# Patient Record
Sex: FEMALE | Race: BLACK OR AFRICAN AMERICAN | NOT HISPANIC OR LATINO
[De-identification: names, ages, dates, MRNs, and addresses within clinical notes are randomized per-mention and may not be internally consistent; named-entity substitution may affect disease eponyms.]

---

## 2018-01-21 ENCOUNTER — FORM ENCOUNTER (OUTPATIENT)
Age: 73
End: 2018-01-21

## 2019-01-06 ENCOUNTER — FORM ENCOUNTER (OUTPATIENT)
Age: 74
End: 2019-01-06

## 2020-01-05 ENCOUNTER — FORM ENCOUNTER (OUTPATIENT)
Age: 75
End: 2020-01-05

## 2020-01-07 ENCOUNTER — FORM ENCOUNTER (OUTPATIENT)
Age: 75
End: 2020-01-07

## 2020-12-30 ENCOUNTER — NON-APPOINTMENT (OUTPATIENT)
Age: 75
End: 2020-12-30

## 2020-12-30 DIAGNOSIS — E11.9 TYPE 2 DIABETES MELLITUS W/OUT COMPLICATIONS: ICD-10-CM

## 2020-12-30 DIAGNOSIS — Z78.0 ASYMPTOMATIC MENOPAUSAL STATE: ICD-10-CM

## 2020-12-30 DIAGNOSIS — Z78.9 OTHER SPECIFIED HEALTH STATUS: ICD-10-CM

## 2020-12-30 DIAGNOSIS — Z80.7 FAMILY HISTORY OF OTHER MALIGNANT NEOPLASMS OF LYMPHOID, HEMATOPOIETIC AND RELATED TISSUES: ICD-10-CM

## 2020-12-30 PROBLEM — Z00.00 ENCOUNTER FOR PREVENTIVE HEALTH EXAMINATION: Status: ACTIVE | Noted: 2020-12-30

## 2021-01-13 ENCOUNTER — APPOINTMENT (OUTPATIENT)
Dept: BREAST CENTER | Facility: CLINIC | Age: 76
End: 2021-01-13

## 2021-03-04 ENCOUNTER — APPOINTMENT (OUTPATIENT)
Dept: BREAST CENTER | Facility: CLINIC | Age: 76
End: 2021-03-04
Payer: MEDICARE

## 2021-03-04 VITALS
WEIGHT: 189 LBS | SYSTOLIC BLOOD PRESSURE: 140 MMHG | HEART RATE: 73 BPM | HEIGHT: 65 IN | BODY MASS INDEX: 31.49 KG/M2 | DIASTOLIC BLOOD PRESSURE: 85 MMHG

## 2021-03-04 DIAGNOSIS — M81.0 AGE-RELATED OSTEOPOROSIS W/OUT CURRENT PATHOLOGICAL FRACTURE: ICD-10-CM

## 2021-03-04 PROCEDURE — 99072 ADDL SUPL MATRL&STAF TM PHE: CPT

## 2021-03-04 PROCEDURE — 99213 OFFICE O/P EST LOW 20 MIN: CPT

## 2022-01-24 ENCOUNTER — APPOINTMENT (OUTPATIENT)
Dept: BREAST CENTER | Facility: CLINIC | Age: 77
End: 2022-01-24
Payer: MEDICARE

## 2022-01-24 ENCOUNTER — NON-APPOINTMENT (OUTPATIENT)
Age: 77
End: 2022-01-24

## 2022-03-24 ENCOUNTER — APPOINTMENT (OUTPATIENT)
Dept: BREAST CENTER | Facility: CLINIC | Age: 77
End: 2022-03-24
Payer: MEDICARE

## 2022-03-24 VITALS
HEART RATE: 71 BPM | HEIGHT: 65 IN | BODY MASS INDEX: 31.36 KG/M2 | DIASTOLIC BLOOD PRESSURE: 75 MMHG | SYSTOLIC BLOOD PRESSURE: 163 MMHG | WEIGHT: 188.25 LBS

## 2022-03-24 DIAGNOSIS — N64.4 MASTODYNIA: ICD-10-CM

## 2022-03-24 PROCEDURE — 99213 OFFICE O/P EST LOW 20 MIN: CPT

## 2022-03-24 RX ORDER — CALCIUM CARBONATE/VITAMIN D3 500-10/5ML
400 LIQUID (ML) ORAL
Refills: 0 | Status: ACTIVE | COMMUNITY

## 2023-02-17 ENCOUNTER — NON-APPOINTMENT (OUTPATIENT)
Age: 78
End: 2023-02-17

## 2023-02-17 ENCOUNTER — APPOINTMENT (OUTPATIENT)
Dept: OPHTHALMOLOGY | Facility: CLINIC | Age: 78
End: 2023-02-17
Payer: MEDICARE

## 2023-02-17 PROCEDURE — 92004 COMPRE OPH EXAM NEW PT 1/>: CPT

## 2023-02-17 PROCEDURE — 92025 CPTRIZED CORNEAL TOPOGRAPHY: CPT

## 2023-02-17 PROCEDURE — 92136 OPHTHALMIC BIOMETRY: CPT

## 2023-03-27 ENCOUNTER — APPOINTMENT (OUTPATIENT)
Dept: BREAST CENTER | Facility: CLINIC | Age: 78
End: 2023-03-27
Payer: MEDICARE

## 2023-03-27 VITALS
BODY MASS INDEX: 35.21 KG/M2 | HEIGHT: 61 IN | HEART RATE: 64 BPM | WEIGHT: 186.5 LBS | DIASTOLIC BLOOD PRESSURE: 83 MMHG | SYSTOLIC BLOOD PRESSURE: 137 MMHG

## 2023-03-27 DIAGNOSIS — Z78.9 OTHER SPECIFIED HEALTH STATUS: ICD-10-CM

## 2023-03-27 PROCEDURE — 99213 OFFICE O/P EST LOW 20 MIN: CPT

## 2023-03-27 RX ORDER — ASPIRIN 81 MG
81 TABLET, DELAYED RELEASE (ENTERIC COATED) ORAL
Refills: 0 | Status: ACTIVE | COMMUNITY

## 2023-03-27 NOTE — HISTORY OF PRESENT ILLNESS
[FreeTextEntry1] : Patient is a 76yo F who presents today for breast cancer screening. Hx of L benign breast excision bx 2003 and 2005. Denies family history of breast or ovarian cancer. Patient denies palpable masses, skin changes, or nipple discharge bilaterally.\par \par GLORIA Lifetime Risk- 2.9%\par \par 12/22/17: B/l MG- JANE\par 1/7/19: B/l MG- no evidence of disease\par 1/8/20: B/l MG- benign microcal bilat\par 3/4/21: B/L MG- Dense. LUOQ postsurgical changes. B/l scattered calcs. B/l scattered ovoid circumscribed subcentimeter masses, unchanged and benign. BIRADS 2.\par 3/24/22: B/l MG & US- heterogenously dense. JANE. BI-RADS 2\par 3/27/23: B/l MG & US- heterogenously dense. JANE. BI-RADS 1

## 2023-03-27 NOTE — PHYSICAL EXAM
[Normocephalic] : normocephalic [EOMI] : extra ocular movement intact [Supple] : supple [No Supraclavicular Adenopathy] : no supraclavicular adenopathy [No Cervical Adenopathy] : no cervical adenopathy [de-identified] : Bilateral breast/axilla/supraclavicular area: No masses, discharge, or adenopathy\par

## 2023-03-27 NOTE — PAST MEDICAL HISTORY
[Menarche Age ____] : age at menarche was [unfilled] [Menopause Age____] : age at menopause was [unfilled] [Total Preg ___] : G[unfilled] [History of Hormone Replacement Treatment] : has no history of hormone replacement treatment [FreeTextEntry6] : no [FreeTextEntry7] : no [FreeTextEntry8] : n/a

## 2023-06-02 NOTE — OPERATIVE REPORT - OPERATIVE RPOSRT DETAILS
DATE OF SURGERY:June 7, 2023    Surgeon:  Marsha Patterson    PRE-OP DIAGNOSIS: Cataract Left Eye    POST-OP DIAGNOSIS: Same    ANESTHESIA: MAC    PROCEDURE: Cataract extraction with intraocular lens implant left eye    SPECIMEN/TISSUE REMOVED: None    ESTIMATED BLOOD LOSS: < 1mL    COMPLICATIONS: None    The patient was brought to the operating room.  A drop of topical anesthetic was placed in the eye. The patient was prepped and draped in the usual sterile fashion, including Betadine drops in the eye. A lid speculum was placed between the lids of the left eye. A paracentesis was made inferior. Intracameral preservative free lidocaine was instilled and the anterior chamber was filled with air, trypan blue, and viscoelastic.. A clear cornea temporal incision was created using a 2.4mm keratome. A continuous curvilinear capsulorhexis was started with a cystotome and completed with capsulorhexis forceps. The lens was hydrodissected with BSS. The lens was then phacoemulsified using a divide and conquer technique. Residual cortical material was removed using automated irrigation and aspiration. The capsular bag was reformed using a viscoelastic. A ZCB00 20.50 lens was inserted into the bag. Symmetric capsular bag fixation was confirmed. The remaining viscoelastic was removed with automated irrigation and aspiration. The wounds were hydrated and checked to be water tight. The lid speculum was removed. Antibiotic/steroid ointment was instilled in the eye and a shield was placed over the eye. The patient left the OR in stable condition having tolerated the procedure well. IMarsha was present throughout the case.

## 2023-06-06 ENCOUNTER — NON-APPOINTMENT (OUTPATIENT)
Age: 78
End: 2023-06-06

## 2023-06-06 NOTE — ASU PATIENT PROFILE, ADULT - FALL HARM RISK - UNIVERSAL INTERVENTIONS
Bed in lowest position, wheels locked, appropriate side rails in place/Call bell, personal items and telephone in reach/Instruct patient to call for assistance before getting out of bed or chair/Non-slip footwear when patient is out of bed/[place] to call system/Physically safe environment - no spills, clutter or unnecessary equipment/Purposeful Proactive Rounding/Room/bathroom lighting operational, light cord in reach

## 2023-06-06 NOTE — ASU PATIENT PROFILE, ADULT - ABLE TO REACH PT
Instruction and time left on voicemail, patient instructed to arrive at 10:30am. No solid/dairy/candy/gum after midnight, water allowed before 09:00am tomorrow, patient reminded to bring photo ID/insurance card, no jewelries/contact lens/valuables, dress in comfortable clothes, no smoking/alcohol/recreational drug use tonight, escort to have a photo ID. Address and callback number was given./no

## 2023-06-07 ENCOUNTER — NON-APPOINTMENT (OUTPATIENT)
Age: 78
End: 2023-06-07

## 2023-06-07 ENCOUNTER — APPOINTMENT (OUTPATIENT)
Dept: OPHTHALMOLOGY | Facility: AMBULATORY SURGERY CENTER | Age: 78
End: 2023-06-07

## 2023-06-07 ENCOUNTER — TRANSCRIPTION ENCOUNTER (OUTPATIENT)
Age: 78
End: 2023-06-07

## 2023-06-07 ENCOUNTER — OUTPATIENT (OUTPATIENT)
Dept: OUTPATIENT SERVICES | Facility: HOSPITAL | Age: 78
LOS: 1 days | Discharge: HOME | End: 2023-06-07
Payer: MEDICARE

## 2023-06-07 VITALS
TEMPERATURE: 98 F | WEIGHT: 182.98 LBS | OXYGEN SATURATION: 97 % | SYSTOLIC BLOOD PRESSURE: 155 MMHG | RESPIRATION RATE: 15 BRPM | HEIGHT: 61 IN | HEART RATE: 63 BPM | DIASTOLIC BLOOD PRESSURE: 48 MMHG

## 2023-06-07 VITALS
DIASTOLIC BLOOD PRESSURE: 54 MMHG | SYSTOLIC BLOOD PRESSURE: 144 MMHG | OXYGEN SATURATION: 96 % | RESPIRATION RATE: 16 BRPM | TEMPERATURE: 97 F | HEART RATE: 69 BPM

## 2023-06-07 DIAGNOSIS — Z98.890 OTHER SPECIFIED POSTPROCEDURAL STATES: Chronic | ICD-10-CM

## 2023-06-07 DIAGNOSIS — S82.891A OTHER FRACTURE OF RIGHT LOWER LEG, INITIAL ENCOUNTER FOR CLOSED FRACTURE: Chronic | ICD-10-CM

## 2023-06-07 PROCEDURE — 66984 XCAPSL CTRC RMVL W/O ECP: CPT | Mod: LT

## 2023-06-07 DEVICE — LENS IOL TECNIS PROTEC ZCB00 20.5D
Type: IMPLANTABLE DEVICE | Site: LEFT (LEFT EYE) | Status: NON-FUNCTIONAL
Removed: 2023-06-07

## 2023-06-07 RX ORDER — KETOROLAC TROMETHAMINE 0.5 %
1 DROPS OPHTHALMIC (EYE)
Refills: 0 | Status: COMPLETED | OUTPATIENT
Start: 2023-06-07 | End: 2023-06-07

## 2023-06-07 RX ORDER — TROPICAMIDE 1 %
1 DROPS OPHTHALMIC (EYE)
Refills: 0 | Status: COMPLETED | OUTPATIENT
Start: 2023-06-07 | End: 2023-06-07

## 2023-06-07 RX ORDER — CYCLOPENTOLATE HYDROCHLORIDE 10 MG/ML
1 SOLUTION/ DROPS OPHTHALMIC
Refills: 0 | Status: COMPLETED | OUTPATIENT
Start: 2023-06-07 | End: 2023-06-07

## 2023-06-07 RX ORDER — OFLOXACIN 0.3 %
1 DROPS OPHTHALMIC (EYE)
Refills: 0 | Status: COMPLETED | OUTPATIENT
Start: 2023-06-07 | End: 2023-06-07

## 2023-06-07 RX ORDER — PHENYLEPHRINE HCL 2.5 %
1 DROPS OPHTHALMIC (EYE)
Refills: 0 | Status: COMPLETED | OUTPATIENT
Start: 2023-06-07 | End: 2023-06-07

## 2023-06-07 RX ADMIN — Medication 1 DROP(S): at 11:48

## 2023-06-07 RX ADMIN — CYCLOPENTOLATE HYDROCHLORIDE 1 DROP(S): 10 SOLUTION/ DROPS OPHTHALMIC at 11:32

## 2023-06-07 RX ADMIN — Medication 1 DROP(S): at 11:32

## 2023-06-07 RX ADMIN — Medication 1 DROP(S): at 11:42

## 2023-06-07 RX ADMIN — Medication 1 DROP(S): at 11:40

## 2023-06-07 RX ADMIN — CYCLOPENTOLATE HYDROCHLORIDE 1 DROP(S): 10 SOLUTION/ DROPS OPHTHALMIC at 11:47

## 2023-06-07 RX ADMIN — CYCLOPENTOLATE HYDROCHLORIDE 1 DROP(S): 10 SOLUTION/ DROPS OPHTHALMIC at 11:41

## 2023-06-07 RX ADMIN — Medication 1 DROP(S): at 11:31

## 2023-06-07 RX ADMIN — Medication 1 DROP(S): at 11:41

## 2023-06-07 RX ADMIN — Medication 1 DROP(S): at 11:47

## 2023-06-07 RX ADMIN — Medication 1 DROP(S): at 11:30

## 2023-06-07 NOTE — PRE-ANESTHESIA EVALUATION ADULT - NSANTHOSAYNRD_GEN_A_CORE
No. MEEK screening performed.  STOP BANG Legend: 0-2 = LOW Risk; 3-4 = INTERMEDIATE Risk; 5-8 = HIGH Risk

## 2023-06-07 NOTE — PRE-ANESTHESIA EVALUATION ADULT - NSATTENDATTESTRD_GEN_ALL_CORE
Bactrim Pregnancy And Lactation Text: This medication is Pregnancy Category D and is known to cause fetal risk.  It is also excreted in breast milk. Topical Sulfur Applications Pregnancy And Lactation Text: This medication is Pregnancy Category C and has an unknown safety profile during pregnancy. It is unknown if this topical medication is excreted in breast milk. Isotretinoin Pregnancy And Lactation Text: This medication is Pregnancy Category X and is considered extremely dangerous during pregnancy. It is unknown if it is excreted in breast milk. The patient has been re-examined and I agree with the above assessment or I updated with my findings. Minocycline Pregnancy And Lactation Text: This medication is Pregnancy Category D and not consider safe during pregnancy. It is also excreted in breast milk. Topical Retinoid Pregnancy And Lactation Text: This medication is Pregnancy Category C. It is unknown if this medication is excreted in breast milk. Benzoyl Peroxide Pregnancy And Lactation Text: This medication is Pregnancy Category C. It is unknown if benzoyl peroxide is excreted in breast milk. Tazorac Pregnancy And Lactation Text: This medication is not safe during pregnancy. It is unknown if this medication is excreted in breast milk. Birth Control Pills Counseling: Birth Control Pill Counseling: I discussed with the patient the potential side effects of OCPs including but not limited to increased risk of stroke, heart attack, thrombophlebitis, deep venous thrombosis, hepatic adenomas, breast changes, GI upset, headaches, and depression.  The patient verbalized understanding of the proper use and possible adverse effects of OCPs. All of the patient's questions and concerns were addressed. Erythromycin Counseling:  I discussed with the patient the risks of erythromycin including but not limited to GI upset, allergic reaction, drug rash, diarrhea, increase in liver enzymes, and yeast infections. High Dose Vitamin A Counseling: Side effects reviewed, pt to contact office should one occur. Topical Clindamycin Pregnancy And Lactation Text: This medication is Pregnancy Category B and is considered safe during pregnancy. It is unknown if it is excreted in breast milk. Birth Control Pills Pregnancy And Lactation Text: This medication should be avoided if pregnant and for the first 30 days post-partum. Doxycycline Pregnancy And Lactation Text: This medication is Pregnancy Category D and not consider safe during pregnancy. It is also excreted in breast milk but is considered safe for shorter treatment courses. Isotretinoin Counseling: Patient should get monthly blood tests, not donate blood, not drive at night if vision affected, not share medication, and not undergo elective surgery for 6 months after tx completed. Side effects reviewed, pt to contact office should one occur. High Dose Vitamin A Pregnancy And Lactation Text: High dose vitamin A therapy is contraindicated during pregnancy and breast feeding. Benzoyl Peroxide Counseling: Patient counseled that medicine may cause skin irritation and bleach clothing.  In the event of skin irritation, the patient was advised to reduce the amount of the drug applied or use it less frequently.   The patient verbalized understanding of the proper use and possible adverse effects of benzoyl peroxide.  All of the patient's questions and concerns were addressed. Minocycline Counseling: Patient advised regarding possible photosensitivity and discoloration of the teeth, skin, lips, tongue and gums.  Patient instructed to avoid sunlight, if possible.  When exposed to sunlight, patients should wear protective clothing, sunglasses, and sunscreen.  The patient was instructed to call the office immediately if the following severe adverse effects occur:  hearing changes, easy bruising/bleeding, severe headache, or vision changes.  The patient verbalized understanding of the proper use and possible adverse effects of minocycline.  All of the patient's questions and concerns were addressed. Topical Clindamycin Counseling: Patient counseled that this medication may cause skin irritation or allergic reactions.  In the event of skin irritation, the patient was advised to reduce the amount of the drug applied or use it less frequently.   The patient verbalized understanding of the proper use and possible adverse effects of clindamycin.  All of the patient's questions and concerns were addressed. Erythromycin Pregnancy And Lactation Text: This medication is Pregnancy Category B and is considered safe during pregnancy. It is also excreted in breast milk. Detail Level: Zone Spironolactone Counseling: Patient advised regarding risks of diarrhea, abdominal pain, hyperkalemia, birth defects (for female patients), liver toxicity and renal toxicity. The patient may need blood work to monitor liver and kidney function and potassium levels while on therapy. The patient verbalized understanding of the proper use and possible adverse effects of spironolactone.  All of the patient's questions and concerns were addressed. Doxycycline Counseling:  Patient counseled regarding possible photosensitivity and increased risk for sunburn.  Patient instructed to avoid sunlight, if possible.  When exposed to sunlight, patients should wear protective clothing, sunglasses, and sunscreen.  The patient was instructed to call the office immediately if the following severe adverse effects occur:  hearing changes, easy bruising/bleeding, severe headache, or vision changes.  The patient verbalized understanding of the proper use and possible adverse effects of doxycycline.  All of the patient's questions and concerns were addressed. Azithromycin Pregnancy And Lactation Text: This medication is considered safe during pregnancy and is also secreted in breast milk. Bactrim Counseling:  I discussed with the patient the risks of sulfa antibiotics including but not limited to GI upset, allergic reaction, drug rash, diarrhea, dizziness, photosensitivity, and yeast infections.  Rarely, more serious reactions can occur including but not limited to aplastic anemia, agranulocytosis, methemoglobinemia, blood dyscrasias, liver or kidney failure, lung infiltrates or desquamative/blistering drug rashes. Include Pregnancy/Lactation Warning?: No Tetracycline Counseling: Patient counseled regarding possible photosensitivity and increased risk for sunburn.  Patient instructed to avoid sunlight, if possible.  When exposed to sunlight, patients should wear protective clothing, sunglasses, and sunscreen.  The patient was instructed to call the office immediately if the following severe adverse effects occur:  hearing changes, easy bruising/bleeding, severe headache, or vision changes.  The patient verbalized understanding of the proper use and possible adverse effects of tetracycline.  All of the patient's questions and concerns were addressed. Patient understands to avoid pregnancy while on therapy due to potential birth defects. Dapsone Counseling: I discussed with the patient the risks of dapsone including but not limited to hemolytic anemia, agranulocytosis, rashes, methemoglobinemia, kidney failure, peripheral neuropathy, headaches, GI upset, and liver toxicity.  Patients who start dapsone require monitoring including baseline LFTs and weekly CBCs for the first month, then every month thereafter.  The patient verbalized understanding of the proper use and possible adverse effects of dapsone.  All of the patient's questions and concerns were addressed. Dapsone Pregnancy And Lactation Text: This medication is Pregnancy Category C and is not considered safe during pregnancy or breast feeding. Topical Sulfur Applications Counseling: Topical Sulfur Counseling: Patient counseled that this medication may cause skin irritation or allergic reactions.  In the event of skin irritation, the patient was advised to reduce the amount of the drug applied or use it less frequently.   The patient verbalized understanding of the proper use and possible adverse effects of topical sulfur application.  All of the patient's questions and concerns were addressed. Topical Retinoid counseling:  Patient advised to apply a pea-sized amount only at bedtime and wait 30 minutes after washing their face before applying.  If too drying, patient may add a non-comedogenic moisturizer. The patient verbalized understanding of the proper use and possible adverse effects of retinoids.  All of the patient's questions and concerns were addressed. Azithromycin Counseling:  I discussed with the patient the risks of azithromycin including but not limited to GI upset, allergic reaction, drug rash, diarrhea, and yeast infections. Spironolactone Pregnancy And Lactation Text: This medication can cause feminization of the male fetus and should be avoided during pregnancy. The active metabolite is also found in breast milk. Tazorac Counseling:  Patient advised that medication is irritating and drying.  Patient may need to apply sparingly and wash off after an hour before eventually leaving it on overnight.  The patient verbalized understanding of the proper use and possible adverse effects of tazorac.  All of the patient's questions and concerns were addressed.

## 2023-06-08 ENCOUNTER — NON-APPOINTMENT (OUTPATIENT)
Age: 78
End: 2023-06-08

## 2023-06-08 ENCOUNTER — APPOINTMENT (OUTPATIENT)
Dept: OPHTHALMOLOGY | Facility: CLINIC | Age: 78
End: 2023-06-08
Payer: MEDICARE

## 2023-06-08 PROCEDURE — 99024 POSTOP FOLLOW-UP VISIT: CPT

## 2023-06-14 ENCOUNTER — APPOINTMENT (OUTPATIENT)
Dept: OPHTHALMOLOGY | Facility: CLINIC | Age: 78
End: 2023-06-14
Payer: MEDICARE

## 2023-06-14 ENCOUNTER — NON-APPOINTMENT (OUTPATIENT)
Age: 78
End: 2023-06-14

## 2023-06-14 PROBLEM — I10 ESSENTIAL (PRIMARY) HYPERTENSION: Chronic | Status: ACTIVE | Noted: 2023-06-07

## 2023-06-14 PROCEDURE — 99024 POSTOP FOLLOW-UP VISIT: CPT

## 2023-07-20 ENCOUNTER — NON-APPOINTMENT (OUTPATIENT)
Age: 78
End: 2023-07-20

## 2023-07-20 ENCOUNTER — APPOINTMENT (OUTPATIENT)
Dept: OPHTHALMOLOGY | Facility: CLINIC | Age: 78
End: 2023-07-20
Payer: MEDICARE

## 2023-07-20 PROCEDURE — 99024 POSTOP FOLLOW-UP VISIT: CPT

## 2023-10-30 ENCOUNTER — APPOINTMENT (OUTPATIENT)
Dept: OPHTHALMOLOGY | Facility: CLINIC | Age: 78
End: 2023-10-30

## 2023-10-31 NOTE — ASU PATIENT PROFILE, ADULT - NSICDXPASTSURGICALHX_GEN_ALL_CORE_FT
PAST SURGICAL HISTORY:  Ankle fracture, right     S/P cataract extraction left    S/P lumpectomy, left breast

## 2023-10-31 NOTE — ASU PATIENT PROFILE, ADULT - NSICDXPASTMEDICALHX_GEN_ALL_CORE_FT
PAST MEDICAL HISTORY:  Hypertension      PAST MEDICAL HISTORY:  DM (diabetes mellitus) not on meds    Hypertension

## 2023-10-31 NOTE — ASU PATIENT PROFILE, ADULT - NS PREOP UNDERSTANDS INFO
No solid food/dairy/candy/gum after 10:30pm tonight; water allowed before 06:30am tomorrow; patient to come with photo ID/insurance/credit card; no jewelries/contact lens/valuables; dress in comfortable clothes; no smoking/alcohol drinking/recreational drug use today; escort to come with photo ID; address and callback number was given/yes

## 2023-10-31 NOTE — ASU PATIENT PROFILE, ADULT - FALL HARM RISK - UNIVERSAL INTERVENTIONS
Bed in lowest position, wheels locked, appropriate side rails in place/Call bell, personal items and telephone in reach/Instruct patient to call for assistance before getting out of bed or chair/Non-slip footwear when patient is out of bed/[place] to call system/Physically safe environment - no spills, clutter or unnecessary equipment/Purposeful Proactive Rounding/Room/bathroom lighting operational, light cord in reach

## 2023-11-01 ENCOUNTER — TRANSCRIPTION ENCOUNTER (OUTPATIENT)
Age: 78
End: 2023-11-01

## 2023-11-01 ENCOUNTER — OUTPATIENT (OUTPATIENT)
Dept: OUTPATIENT SERVICES | Facility: HOSPITAL | Age: 78
LOS: 1 days | Discharge: HOME | End: 2023-11-01
Payer: MEDICARE

## 2023-11-01 ENCOUNTER — APPOINTMENT (OUTPATIENT)
Dept: OPHTHALMOLOGY | Facility: AMBULATORY SURGERY CENTER | Age: 78
End: 2023-11-01

## 2023-11-01 ENCOUNTER — NON-APPOINTMENT (OUTPATIENT)
Age: 78
End: 2023-11-01

## 2023-11-01 VITALS
SYSTOLIC BLOOD PRESSURE: 131 MMHG | HEART RATE: 69 BPM | HEIGHT: 62 IN | TEMPERATURE: 98 F | OXYGEN SATURATION: 97 % | RESPIRATION RATE: 16 BRPM | WEIGHT: 178.57 LBS | DIASTOLIC BLOOD PRESSURE: 50 MMHG

## 2023-11-01 VITALS
OXYGEN SATURATION: 98 % | DIASTOLIC BLOOD PRESSURE: 71 MMHG | HEART RATE: 67 BPM | SYSTOLIC BLOOD PRESSURE: 118 MMHG | RESPIRATION RATE: 16 BRPM | TEMPERATURE: 98 F

## 2023-11-01 DIAGNOSIS — Z98.49 CATARACT EXTRACTION STATUS, UNSPECIFIED EYE: Chronic | ICD-10-CM

## 2023-11-01 DIAGNOSIS — Z98.890 OTHER SPECIFIED POSTPROCEDURAL STATES: Chronic | ICD-10-CM

## 2023-11-01 DIAGNOSIS — S82.891A OTHER FRACTURE OF RIGHT LOWER LEG, INITIAL ENCOUNTER FOR CLOSED FRACTURE: Chronic | ICD-10-CM

## 2023-11-01 LAB
GLUCOSE BLDC GLUCOMTR-MCNC: 84 MG/DL — SIGNIFICANT CHANGE UP (ref 70–99)
GLUCOSE BLDC GLUCOMTR-MCNC: 84 MG/DL — SIGNIFICANT CHANGE UP (ref 70–99)

## 2023-11-01 PROCEDURE — 66984 XCAPSL CTRC RMVL W/O ECP: CPT | Mod: RT

## 2023-11-01 PROCEDURE — V2787: CPT | Mod: RT

## 2023-11-01 PROCEDURE — 92136 OPHTHALMIC BIOMETRY: CPT | Mod: 26,RT

## 2023-11-01 DEVICE — IMPLANTABLE DEVICE
Type: IMPLANTABLE DEVICE | Site: RIGHT | Status: NON-FUNCTIONAL
Removed: 2023-11-01

## 2023-11-01 RX ORDER — LISINOPRIL 2.5 MG/1
1 TABLET ORAL
Refills: 0 | DISCHARGE

## 2023-11-01 RX ORDER — ACETAMINOPHEN 500 MG
650 TABLET ORAL ONCE
Refills: 0 | Status: DISCONTINUED | OUTPATIENT
Start: 2023-11-01 | End: 2023-11-01

## 2023-11-01 RX ORDER — TROPICAMIDE 1 %
1 DROPS OPHTHALMIC (EYE)
Refills: 0 | Status: COMPLETED | OUTPATIENT
Start: 2023-11-01 | End: 2023-11-01

## 2023-11-01 RX ORDER — SODIUM CHLORIDE 9 MG/ML
1000 INJECTION, SOLUTION INTRAVENOUS
Refills: 0 | Status: DISCONTINUED | OUTPATIENT
Start: 2023-11-01 | End: 2023-11-01

## 2023-11-01 RX ORDER — KETOROLAC TROMETHAMINE 0.5 %
1 DROPS OPHTHALMIC (EYE)
Refills: 0 | Status: COMPLETED | OUTPATIENT
Start: 2023-11-01 | End: 2023-11-01

## 2023-11-01 RX ORDER — OFLOXACIN 0.3 %
1 DROPS OPHTHALMIC (EYE)
Refills: 0 | Status: COMPLETED | OUTPATIENT
Start: 2023-11-01 | End: 2023-11-01

## 2023-11-01 RX ORDER — PHENYLEPHRINE HCL 2.5 %
1 DROPS OPHTHALMIC (EYE)
Refills: 0 | Status: COMPLETED | OUTPATIENT
Start: 2023-11-01 | End: 2023-11-01

## 2023-11-01 RX ORDER — CYCLOPENTOLATE HYDROCHLORIDE 10 MG/ML
1 SOLUTION/ DROPS OPHTHALMIC
Refills: 0 | Status: COMPLETED | OUTPATIENT
Start: 2023-11-01 | End: 2023-11-01

## 2023-11-01 RX ORDER — ONDANSETRON 8 MG/1
4 TABLET, FILM COATED ORAL ONCE
Refills: 0 | Status: DISCONTINUED | OUTPATIENT
Start: 2023-11-01 | End: 2023-11-01

## 2023-11-01 RX ADMIN — Medication 1 DROP(S): at 08:26

## 2023-11-01 RX ADMIN — CYCLOPENTOLATE HYDROCHLORIDE 1 DROP(S): 10 SOLUTION/ DROPS OPHTHALMIC at 08:26

## 2023-11-01 RX ADMIN — CYCLOPENTOLATE HYDROCHLORIDE 1 DROP(S): 10 SOLUTION/ DROPS OPHTHALMIC at 08:16

## 2023-11-01 RX ADMIN — Medication 1 DROP(S): at 08:21

## 2023-11-01 RX ADMIN — Medication 1 DROP(S): at 08:16

## 2023-11-01 RX ADMIN — CYCLOPENTOLATE HYDROCHLORIDE 1 DROP(S): 10 SOLUTION/ DROPS OPHTHALMIC at 08:21

## 2023-11-01 NOTE — ASU DISCHARGE PLAN (ADULT/PEDIATRIC) - NS MD DC FALL RISK RISK
For information on Fall & Injury Prevention, visit: [email or web address] OR  [email or web address] OR  https://www.cdc.gov/steadi/patient.html

## 2023-11-01 NOTE — PRE-ANESTHESIA EVALUATION ADULT - NSANTHGENDERRD_ENT_A_CORE
No
Detail Level: Detailed
Quality 130: Documentation Of Current Medications In The Medical Record: Current Medications Documented

## 2023-11-02 ENCOUNTER — APPOINTMENT (OUTPATIENT)
Dept: OPHTHALMOLOGY | Facility: CLINIC | Age: 78
End: 2023-11-02
Payer: MEDICARE

## 2023-11-02 ENCOUNTER — NON-APPOINTMENT (OUTPATIENT)
Age: 78
End: 2023-11-02

## 2023-11-02 PROCEDURE — 99024 POSTOP FOLLOW-UP VISIT: CPT

## 2023-11-08 ENCOUNTER — NON-APPOINTMENT (OUTPATIENT)
Age: 78
End: 2023-11-08

## 2023-11-08 ENCOUNTER — APPOINTMENT (OUTPATIENT)
Dept: OPHTHALMOLOGY | Facility: CLINIC | Age: 78
End: 2023-11-08
Payer: MEDICARE

## 2023-11-08 PROBLEM — E11.9 TYPE 2 DIABETES MELLITUS WITHOUT COMPLICATIONS: Chronic | Status: ACTIVE | Noted: 2023-11-01

## 2023-11-08 PROCEDURE — 99024 POSTOP FOLLOW-UP VISIT: CPT

## 2023-12-06 ENCOUNTER — NON-APPOINTMENT (OUTPATIENT)
Age: 78
End: 2023-12-06

## 2023-12-06 ENCOUNTER — APPOINTMENT (OUTPATIENT)
Dept: OPHTHALMOLOGY | Facility: CLINIC | Age: 78
End: 2023-12-06
Payer: MEDICARE

## 2023-12-06 PROCEDURE — 99024 POSTOP FOLLOW-UP VISIT: CPT

## 2024-04-01 ENCOUNTER — APPOINTMENT (OUTPATIENT)
Dept: BREAST CENTER | Facility: CLINIC | Age: 79
End: 2024-04-01
Payer: MEDICARE

## 2024-04-01 VITALS
WEIGHT: 184 LBS | DIASTOLIC BLOOD PRESSURE: 76 MMHG | SYSTOLIC BLOOD PRESSURE: 140 MMHG | HEART RATE: 67 BPM | HEIGHT: 61 IN | BODY MASS INDEX: 34.74 KG/M2

## 2024-04-01 DIAGNOSIS — N60.11 DIFFUSE CYSTIC MASTOPATHY OF RIGHT BREAST: ICD-10-CM

## 2024-04-01 DIAGNOSIS — Z80.42 FAMILY HISTORY OF MALIGNANT NEOPLASM OF PROSTATE: ICD-10-CM

## 2024-04-01 DIAGNOSIS — R92.8 OTHER ABNORMAL AND INCONCLUSIVE FINDINGS ON DIAGNOSTIC IMAGING OF BREAST: ICD-10-CM

## 2024-04-01 DIAGNOSIS — Z12.39 ENCOUNTER FOR OTHER SCREENING FOR MALIGNANT NEOPLASM OF BREAST: ICD-10-CM

## 2024-04-01 DIAGNOSIS — N64.59 OTHER SIGNS AND SYMPTOMS IN BREAST: ICD-10-CM

## 2024-04-01 DIAGNOSIS — R92.30 DENSE BREASTS, UNSPECIFIED: ICD-10-CM

## 2024-04-01 PROCEDURE — 99214 OFFICE O/P EST MOD 30 MIN: CPT

## 2024-04-01 RX ORDER — ATORVASTATIN CALCIUM 80 MG/1
TABLET, FILM COATED ORAL
Refills: 0 | Status: ACTIVE | COMMUNITY

## 2024-04-01 RX ORDER — LISINOPRIL 30 MG/1
TABLET ORAL
Refills: 0 | Status: ACTIVE | COMMUNITY

## 2024-04-01 NOTE — PHYSICAL EXAM
[Normocephalic] : normocephalic [EOMI] : extra ocular movement intact [Supple] : supple [No Supraclavicular Adenopathy] : no supraclavicular adenopathy [No Cervical Adenopathy] : no cervical adenopathy [de-identified] : Bilateral breast/axilla/supraclavicular area: No masses, discharge, or adenopathy\par

## 2024-04-01 NOTE — HISTORY OF PRESENT ILLNESS
[FreeTextEntry1] : Patient is a 79yo F who presents today for breast cancer screening. Hx of L benign breast excision bx 2003 and 2005. Denies family history of breast or ovarian cancer. Patient denies palpable masses, skin changes, or nipple discharge bilaterally.  GLORIA Lifetime Risk- 2.9%  12/22/17: B/l MG- JANE 1/7/19: B/l MG- no evidence of disease 1/8/20: B/l MG- benign microcal bilat 3/4/21: B/L MG- Dense. LUOQ postsurgical changes. B/l scattered calcs. B/l scattered ovoid circumscribed subcentimeter masses, unchanged and benign. BIRADS 2. 3/24/22: B/l MG & US- heterogenously dense. JANE. BI-RADS 2 3/27/23: B/l MG & US- heterogenously dense. JANE. BI-RADS 1 4/1/24: B/L MG & US-heterogeneously dense, central right 5FN asymmetry with subtle distortion, stereo rec. BIRADS 4

## 2024-04-09 ENCOUNTER — NON-APPOINTMENT (OUTPATIENT)
Age: 79
End: 2024-04-09

## 2024-04-09 DIAGNOSIS — N60.99 UNSPECIFIED BENIGN MAMMARY DYSPLASIA OF UNSPECIFIED BREAST: ICD-10-CM

## 2024-04-10 ENCOUNTER — NON-APPOINTMENT (OUTPATIENT)
Age: 79
End: 2024-04-10

## 2024-04-24 ENCOUNTER — NON-APPOINTMENT (OUTPATIENT)
Age: 79
End: 2024-04-24

## 2024-05-13 ENCOUNTER — NON-APPOINTMENT (OUTPATIENT)
Age: 79
End: 2024-05-13

## 2024-05-17 ENCOUNTER — RESULT REVIEW (OUTPATIENT)
Age: 79
End: 2024-05-17

## 2024-05-22 ENCOUNTER — NON-APPOINTMENT (OUTPATIENT)
Age: 79
End: 2024-05-22

## 2024-06-10 ENCOUNTER — APPOINTMENT (OUTPATIENT)
Dept: PLASTIC SURGERY | Facility: CLINIC | Age: 79
End: 2024-06-10
Payer: MEDICARE

## 2024-06-10 VITALS — HEART RATE: 63 BPM | BODY MASS INDEX: 33.99 KG/M2 | WEIGHT: 180 LBS | OXYGEN SATURATION: 97 % | HEIGHT: 61 IN

## 2024-06-10 DIAGNOSIS — Z42.1 ENCOUNTER FOR BREAST RECONSTRUCTION FOLLOWING MASTECTOMY: ICD-10-CM

## 2024-06-10 PROCEDURE — 99204 OFFICE O/P NEW MOD 45 MIN: CPT

## 2024-06-10 PROCEDURE — 99214 OFFICE O/P EST MOD 30 MIN: CPT

## 2024-06-10 NOTE — HISTORY OF PRESENT ILLNESS
[FreeTextEntry1] : 77 y/o female with newly diagnosed right breast IDC referred by Dr. Kelvin Daily presents for initial consultation to discuss breast reconstruction options after right mastectomy. Patient had right breast biopsy on 05/17/2024 and breast MRI on 04/23/2024. Patient does not know if she will need radiation or chemotherapy. Patient has history of left benign breast excision biopsy in 2003 and 2005. Patient did not have genetic testing. No family history of breast or ovarian cancer. PMH: DM, diet controlled No history of bleeding or clotting disorder.  PE: no nipple discharge, no skin changes, scattered hyperpigmentation, right nipple retracted, grade II/III ptosis. BD: 14-15 cm  Today we discussed all options for breast reconstruction including no reconstruction and reconstruction using tissue expanders and implants. The nature of each surgery, its risks, benefits, alternatives, expected postoperative course, recovery and long term results were discussed in detail. All questions were answered. She is most interested in staged TE to implant reconstruction. Will coordinate with Dr. Villalobos.

## 2024-07-22 VITALS
HEART RATE: 70 BPM | HEIGHT: 62 IN | DIASTOLIC BLOOD PRESSURE: 73 MMHG | RESPIRATION RATE: 16 BRPM | WEIGHT: 182.98 LBS | OXYGEN SATURATION: 95 % | SYSTOLIC BLOOD PRESSURE: 128 MMHG | TEMPERATURE: 98 F

## 2024-07-22 RX ORDER — ASPIRIN 325 MG/1
81 TABLET, FILM COATED ORAL
Refills: 0 | DISCHARGE

## 2024-07-22 NOTE — ASU PREOP CHECKLIST - CHLOROHEXIDINE WASH IN ASU
23-Jul-2024 07:24
follow up with Dr. Gallagher  in AM   will need out patient spinal ultrasound for sacral dimple finding on PE     male born 38.6 weeks GA via uncomplicated   maternal labs negative, GBS negative, COVID negative  apgars 9/9 at 1/5 min   hepatitis B vaccine given   CCHD passed  OAE passed bilaterally   TcB 4.0 at 24 HOL, LRZ   d/c weight 3295 (3% loss from BW)

## 2024-07-22 NOTE — ASU PATIENT PROFILE, ADULT - NSICDXPASTSURGICALHX_GEN_ALL_CORE_FT
PAST SURGICAL HISTORY:  Ankle fracture, right 1997    S/P cataract extraction b/l    S/P lumpectomy, left breast 2003, 2005

## 2024-07-22 NOTE — ASU PATIENT PROFILE, ADULT - NSICDXPASTMEDICALHX_GEN_ALL_CORE_FT
PAST MEDICAL HISTORY:  Breast cancer right DCIS    DM (diabetes mellitus) not on meds    Elevated serum cholesterol     Hypertension

## 2024-07-23 ENCOUNTER — OUTPATIENT (OUTPATIENT)
Dept: OUTPATIENT SERVICES | Facility: HOSPITAL | Age: 79
LOS: 1 days | Discharge: HOME | End: 2024-07-23
Payer: MEDICARE

## 2024-07-23 ENCOUNTER — RESULT REVIEW (OUTPATIENT)
Age: 79
End: 2024-07-23

## 2024-07-23 ENCOUNTER — TRANSCRIPTION ENCOUNTER (OUTPATIENT)
Age: 79
End: 2024-07-23

## 2024-07-23 ENCOUNTER — APPOINTMENT (OUTPATIENT)
Dept: BREAST CENTER | Facility: AMBULATORY SURGERY CENTER | Age: 79
End: 2024-07-23

## 2024-07-23 VITALS
RESPIRATION RATE: 20 BRPM | DIASTOLIC BLOOD PRESSURE: 66 MMHG | OXYGEN SATURATION: 97 % | HEART RATE: 75 BPM | SYSTOLIC BLOOD PRESSURE: 120 MMHG

## 2024-07-23 DIAGNOSIS — Z98.49 CATARACT EXTRACTION STATUS, UNSPECIFIED EYE: Chronic | ICD-10-CM

## 2024-07-23 DIAGNOSIS — Z98.890 OTHER SPECIFIED POSTPROCEDURAL STATES: Chronic | ICD-10-CM

## 2024-07-23 DIAGNOSIS — S82.891A OTHER FRACTURE OF RIGHT LOWER LEG, INITIAL ENCOUNTER FOR CLOSED FRACTURE: Chronic | ICD-10-CM

## 2024-07-23 LAB — GLUCOSE BLDC GLUCOMTR-MCNC: 97 MG/DL — SIGNIFICANT CHANGE UP (ref 70–99)

## 2024-07-23 PROCEDURE — 38505 NEEDLE BIOPSY LYMPH NODES: CPT | Mod: RT

## 2024-07-23 PROCEDURE — 38900 IO MAP OF SENT LYMPH NODE: CPT

## 2024-07-23 PROCEDURE — 38790 INJECT FOR LYMPHATIC X-RAY: CPT | Mod: RT

## 2024-07-23 PROCEDURE — 76098 X-RAY EXAM SURGICAL SPECIMEN: CPT | Mod: 26

## 2024-07-23 PROCEDURE — 82962 GLUCOSE BLOOD TEST: CPT

## 2024-07-23 PROCEDURE — 19303 MAST SIMPLE COMPLETE: CPT | Mod: RT

## 2024-07-23 PROCEDURE — C1889: CPT

## 2024-07-23 PROCEDURE — 88307 TISSUE EXAM BY PATHOLOGIST: CPT | Mod: 26

## 2024-07-23 PROCEDURE — 76098 X-RAY EXAM SURGICAL SPECIMEN: CPT

## 2024-07-23 DEVICE — CLIP APPLIER ETHICON LIGACLIP 11.5" MEDIUM: Type: IMPLANTABLE DEVICE | Site: RIGHT BREAST | Status: FUNCTIONAL

## 2024-07-23 RX ORDER — ASPIRIN 325 MG/1
0 TABLET, FILM COATED ORAL
Refills: 0 | DISCHARGE

## 2024-07-23 RX ORDER — ACETAMINOPHEN 325 MG
1000 TABLET ORAL ONCE
Refills: 0 | Status: DISCONTINUED | OUTPATIENT
Start: 2024-07-23 | End: 2024-07-23

## 2024-07-23 RX ORDER — ONDANSETRON HYDROCHLORIDE 2 MG/ML
4 INJECTION INTRAMUSCULAR; INTRAVENOUS EVERY 4 HOURS
Refills: 0 | Status: DISCONTINUED | OUTPATIENT
Start: 2024-07-23 | End: 2024-07-23

## 2024-07-23 RX ORDER — DEXTROSE MONOHYDRATE AND SODIUM CHLORIDE 5; .3 G/100ML; G/100ML
1000 INJECTION, SOLUTION INTRAVENOUS
Refills: 0 | Status: DISCONTINUED | OUTPATIENT
Start: 2024-07-23 | End: 2024-07-23

## 2024-07-23 RX ORDER — ACETAMINOPHEN 325 MG
650 TABLET ORAL EVERY 6 HOURS
Refills: 0 | Status: DISCONTINUED | OUTPATIENT
Start: 2024-07-23 | End: 2024-07-23

## 2024-07-23 RX ORDER — ROSUVASTATIN CALCIUM 20 MG/1
1 TABLET ORAL
Refills: 0 | DISCHARGE

## 2024-07-23 NOTE — PRE-ANESTHESIA EVALUATION ADULT - NSANTHPMHFT_GEN_ALL_CORE
77yo obese F with HTN, DM, now with right brest ca presents for total mastectomy 77yo obese F with HTN, DM, now with right breast ca presents for total mastectomy. The pt denies cardiac symptoms, able to walk daily, but avoids stairs.

## 2024-07-23 NOTE — PROGRESS NOTE ADULT - SUBJECTIVE AND OBJECTIVE BOX
SUBJECTIVE: Patient was seated in her chair in the PACU. She denies any pain. She also denies N/V/Chest pain/Respiratory Distress.       MEDICATIONS  (STANDING):  lactated ringers. 1000 milliLiter(s) (75 mL/Hr) IV Continuous <Continuous>    MEDICATIONS  (PRN):  acetaminophen     Tablet .. 650 milliGRAM(s) Oral every 6 hours PRN Mild Pain (1 - 3), Moderate Pain (4 - 6)  acetaminophen   IVPB .. 1000 milliGRAM(s) IV Intermittent once PRN Severe Pain (7 - 10)  ondansetron Injectable 4 milliGRAM(s) IV Push every 4 hours PRN Nausea and/or Vomiting      Vital Signs Last 24 Hrs  T(C): 36.3 (23 Jul 2024 11:41), Max: 36.5 (23 Jul 2024 07:38)  T(F): 97.4 (23 Jul 2024 11:41), Max: 97.4 (23 Jul 2024 11:41)  HR: 72 (23 Jul 2024 12:41) (64 - 76)  BP: 127/66 (23 Jul 2024 12:41) (127/66 - 161/73)  BP(mean): 98 (23 Jul 2024 12:41) (87 - 118)  RR: 23 (23 Jul 2024 12:41) (16 - 23)  SpO2: 99% (23 Jul 2024 12:41) (95% - 100%)    Parameters below as of 23 Jul 2024 11:51  Patient On (Oxygen Delivery Method): nasal cannula  O2 Flow (L/min): 3      PHYSICAL EXAM:      Constitutional: A&Ox3    Breasts: Incision is intact, with steri strips overlying it. Gauze on top w/ minimal blood, and abdominal pad to reinforce it. KRYSTA Bulb draining serosanguinous fluid.     Respiratory: non labored breathing, no respiratory distress    Cardiovascular: NSR, RRR    Gastrointestinal: Soft, NT, ND.     Extremities: (-) edema, Upper and Lower extremities warm, well perfused                  I&O's Detail      LABS:                RADIOLOGY & ADDITIONAL STUDIES:

## 2024-07-23 NOTE — PROGRESS NOTE ADULT - ASSESSMENT
This is a 77 yo F diagnosed with R DCIS s/p R Total Mastectomy and SLND, recovering well from surgery, with adequate pain control      OTC Tylenol for pain control  d/c home w/ KRYSTA Drain  f/u w/ Dr. Villalobos in 1 week This is a 79 yo F diagnosed with R invasive ductal carcinoma s/p R Total Mastectomy and SLND, recovering well from surgery, with adequate pain control      OTC Tylenol for pain control  d/c home w/ KRYSTA Drain  f/u w/ Dr. Villalobos in 1 week

## 2024-07-23 NOTE — BRIEF OPERATIVE NOTE - NSICDXBRIEFPREOP_GEN_ALL_CORE_FT
PRE-OP DIAGNOSIS:  Invasive ductal carcinoma of breast 23-Jul-2024 11:57:18 Multifocal, right breast Adonis Live

## 2024-07-23 NOTE — ASU DISCHARGE PLAN (ADULT/PEDIATRIC) - ASU DC SPECIAL INSTRUCTIONSFT
1. Drain care as instructed by the nurse  2. Pain management with Acetaminophen 650mg every 6 hours  3. Avoid wetting dressing  4. Wear supportive bra at home.

## 2024-07-23 NOTE — BRIEF OPERATIVE NOTE - NSICDXBRIEFPOSTOP_GEN_ALL_CORE_FT
POST-OP DIAGNOSIS:  Invasive ductal carcinoma of breast 23-Jul-2024 11:57:38 Multifocal, right breast Adonis Live

## 2024-07-23 NOTE — PRE-ANESTHESIA EVALUATION ADULT - NSANTHPEFT_GEN_ALL_CORE
Alert, oriented, obese  CN grossly intact  Extremities warm without edema  Lungs clear to auscultation  RRR

## 2024-07-23 NOTE — ASU DISCHARGE PLAN (ADULT/PEDIATRIC) - CARE PROVIDER_API CALL
Yajaira Mcrae  Surgery  [address]  [place] [address]  Phone: [phone]  Fax: [phone]  Established Patient  Follow Up Time: 1 week

## 2024-07-23 NOTE — BRIEF OPERATIVE NOTE - OPERATION/FINDINGS
Magtrace administered in retroareolar. Ellipsoid skin incision. Flaps raised superiorly to clavicle, medially to sternum, inferiorly to IMF, laterally to border of latissimus dorsi, and posteriorly to pec major fascia using electrocautery and sharp dissection. Specimen underwent x-ray for further clips identification. Sentigmag probe for retrieval of sentinel lymph node and posterior excision.   Magtrace administered in retroareolar. Ellipsoid skin incision. Flaps raised superiorly to clavicle, medially to sternum, inferiorly to IMF, laterally to border of latissimus dorsi, and posteriorly to pec major fascia using electrocautery and sharp dissection. Specimen underwent x-ray for further clips identification. Sentigmag probe for retrieval of sentinel lymph node and posterior excision.  Flaps closed with Vicryl 3-0 for subcutaneous layer,  and Monocryl 4-0 for subdermal layer.

## 2024-07-23 NOTE — BRIEF OPERATIVE NOTE - NSICDXBRIEFPROCEDURE_GEN_ALL_CORE_FT
PROCEDURES:  Total mastectomy with sentinel lymph node mapping and biopsy 23-Jul-2024 11:56:59 Right breast Adonis Live

## 2024-07-29 ENCOUNTER — NON-APPOINTMENT (OUTPATIENT)
Age: 79
End: 2024-07-29

## 2024-07-30 ENCOUNTER — NON-APPOINTMENT (OUTPATIENT)
Age: 79
End: 2024-07-30

## 2024-07-30 ENCOUNTER — APPOINTMENT (OUTPATIENT)
Dept: BREAST CENTER | Facility: CLINIC | Age: 79
End: 2024-07-30

## 2024-07-30 LAB — SURGICAL PATHOLOGY STUDY: SIGNIFICANT CHANGE UP

## 2024-07-31 PROBLEM — E78.00 PURE HYPERCHOLESTEROLEMIA, UNSPECIFIED: Chronic | Status: ACTIVE | Noted: 2024-07-22

## 2024-08-01 ENCOUNTER — NON-APPOINTMENT (OUTPATIENT)
Age: 79
End: 2024-08-01

## 2024-08-01 NOTE — HISTORY OF PRESENT ILLNESS
[FreeTextEntry1] : Patient is a 77yo F who presents today for postop visit. Patient recently underwent R total mastectomy with RSLNB for diagnosis of R multicentric IDC (ER/MD +, HER2 -). 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 On extent of disease MRI, recommendation for 3 site mri biopsy was made yielding R multicentric IDC (ER/MD +/ HER2-).   Denies family history of breast or ovarian cancer. Patient denies fever, chills, purulent discharge from surgical site, palpable masses, skin changes, or left nipple discharge.  Staging: pT1bN0  12/22/17: B/l MG- JANE 1/7/19: B/l MG- no evidence of disease 1/8/20: B/l MG- benign microcal bilat 3/4/21: B/L MG- Dense. LUOQ postsurgical changes. B/l scattered calcs. B/l scattered ovoid circumscribed subcentimeter masses, unchanged and benign. BIRADS 2. 3/24/22: B/l MG & US- heterogenously dense. JANE. BI-RADS 2 3/27/23: B/l MG & US- heterogenously dense. JANE. BI-RADS 1 4/1/24: B/L MG & US-heterogeneously dense, central right 5FN asymmetry with subtle distortion, stereo rec. BIRADS 4 4/4/2024-right stereotactic biopsy architectural distortion-ADH bordering on DCIS (cork clip). 4/23/24 MRI: Heterogeneously dense. R 1.9cm nonmass enhancement 6:00 proven ADH bordering DCIS. R 12:00 UIQ and R 6:00 LIQ with multiple indeterminate enhancing masses and nonmass enhancement (rec MRI bx of 6:00 x 2, 0.9cm enhancing mass and 0.9cm posterior depth linear nonmass enhancement & rec MRI bx 12:00 of 0.5 cm). R 2 prominent axillary LN (rec targeted US w/ poss bx). BI-RADS 4 5/9/24 R US: R axillary LNs demonstrate focal cortical thickening, with the largest node measuring 1.3cm (f/u US bx). BI-RADS 4. 5/9/2024-right MR biopsy 12:00-IDC hourglass clip                Right MRI biopsy 6:00-IDC buckle shaped clip                Right MR biopsy right lower outer quadrant-IDC Infinity clip receptors pending. (ER/MD +, HER2 -) 5/17/2024: R ax US bx: lymph node, negative for carcinoma, benign and concordant.  7/23/2024: Right Mastectomy, RSLNB: IDC, well differentiated, multiple foci, largest focus 8mm. DCIS. Margins negative. 0/2 LN.

## 2024-08-01 NOTE — PHYSICAL EXAM
[Normocephalic] : normocephalic [EOMI] : extra ocular movement intact [Supple] : supple [No Supraclavicular Adenopathy] : no supraclavicular adenopathy [No Cervical Adenopathy] : no cervical adenopathy [de-identified] : Bilateral breast/axilla/supraclavicular area: No masses, discharge, or adenopathy\par

## 2024-08-01 NOTE — PLAN
[TextEntry] : Reviewed surgical pathology with the patient in great detail.  Discussed with the patient that an Oncotype will be sent and appointment with medical oncologywill be made for discussion about recommendations regarding chemotherapy/AI.  Patient will present February 2025 for office visit with PA.  Patient will have left mammogram and sonogram performed April 2025.  Patient will present for office visit August 2025 with me.  Patient will have a high risk screening MRI October 2025.  Referral to radiation oncology not applicable.

## 2024-08-02 ENCOUNTER — NON-APPOINTMENT (OUTPATIENT)
Age: 79
End: 2024-08-02

## 2024-08-05 ENCOUNTER — NON-APPOINTMENT (OUTPATIENT)
Age: 79
End: 2024-08-05

## 2024-08-05 PROBLEM — C50.919 MALIGNANT NEOPLASM OF UNSPECIFIED SITE OF UNSPECIFIED FEMALE BREAST: Chronic | Status: ACTIVE | Noted: 2024-07-22

## 2024-08-15 ENCOUNTER — APPOINTMENT (OUTPATIENT)
Dept: HEMATOLOGY ONCOLOGY | Facility: CLINIC | Age: 79
End: 2024-08-15
Payer: MEDICARE

## 2024-08-15 ENCOUNTER — APPOINTMENT (OUTPATIENT)
Dept: BREAST CENTER | Facility: CLINIC | Age: 79
End: 2024-08-15
Payer: MEDICARE

## 2024-08-15 VITALS
DIASTOLIC BLOOD PRESSURE: 84 MMHG | WEIGHT: 181 LBS | BODY MASS INDEX: 34.17 KG/M2 | SYSTOLIC BLOOD PRESSURE: 155 MMHG | HEIGHT: 61 IN | HEART RATE: 71 BPM

## 2024-08-15 DIAGNOSIS — Z17.0 MALIGNANT NEOPLASM OF UNSPECIFIED SITE OF RIGHT FEMALE BREAST: ICD-10-CM

## 2024-08-15 DIAGNOSIS — C50.911 MALIGNANT NEOPLASM OF UNSPECIFIED SITE OF RIGHT FEMALE BREAST: ICD-10-CM

## 2024-08-15 PROCEDURE — 99024 POSTOP FOLLOW-UP VISIT: CPT

## 2024-08-15 PROCEDURE — G2211 COMPLEX E/M VISIT ADD ON: CPT | Mod: NC

## 2024-08-15 PROCEDURE — 99203 OFFICE O/P NEW LOW 30 MIN: CPT

## 2024-08-15 NOTE — PLAN
[TextEntry] : Reviewed surgical pathology with the patient in great detail.  Provided copy of surgical pathology to patient.  We reviewed low Oncotype results, patient to meet with medical oncologist to discuss adjuvant therapy today.  Patient will have left mammogram and sonogram performed April 2025 and be reexamined same day..  Referral to radiation oncology not applicable.

## 2024-08-15 NOTE — HISTORY OF PRESENT ILLNESS
[FreeTextEntry1] : Patient is a 79yo F who presents today for postop visit. Patient recently underwent R total mastectomy with RSLNB for diagnosis of R multicentric IDC (ER/CA +, HER2 -). 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 On extent of disease MRI, recommendation for 3 site mri biopsy was made yielding R multicentric IDC (ER/CA +/ HER2-).  Oncotype 6. Denies family history of breast or ovarian cancer. Patient denies fever, chills, purulent discharge from surgical site, palpable masses, skin changes, or left nipple discharge.  Staging: pT1bN0  12/22/17: B/l MG- JANE 1/7/19: B/l MG- no evidence of disease 1/8/20: B/l MG- benign microcal bilat 3/4/21: B/L MG- Dense. LUOQ postsurgical changes. B/l scattered calcs. B/l scattered ovoid circumscribed subcentimeter masses, unchanged and benign. BIRAD2. 3/24/22: B/l MG & US- heterogenously dense. JANE. BI-RADS 2 3/27/23: B/l MG & US- heterogenously dense. JANE. BI-RADS 1 4/1/24: B/L MG & US-heterogeneously dense, central right 5FN asymmetry with subtle distortion, stereo rec. BIRADS 4 4/4/2024-right stereotactic biopsy architectural distortion-ADH bordering on DCIS (cork clip). 4/23/24 MRI: Heterogeneously dense. R 1.9cm nonmass enhancement 6:00 proven ADH bordering DCIS. R 12:00 UIQ and R 6:00 LIQ multiple indeterminate enhancing masses and nonmass enhancement (rec R MRI bx x 3 - 6:00  0.9cm enhancing mass, 6:00 0.9cm posterior depth linear nonmass enhancement, 2:00  0.5 cm). R 2 prominent axillary LN (rec targeted US w/ poss bx). BI-RADS 4 5/9/24 R US: R axillary LNs demonstrate focal cortical thickening, with the largest node measuring 1.3cm (f/u US bx). BI-RADS 4. 5/9/2024-right MR biopsy 12:00-IDC hourglass clip                Right MRI biopsy 6:00-IDC buckle shaped clip                Right MR biopsy right lower outer quadrant-IDC Infinity clip receptors pending. (ER/CA +, HER2 -) 5/17/2024: R ax US bx: lymph node, negative for carcinoma, benign and concordant.  7/23/2024: Right Mastectomy, RSLNB: IDC, well differentiated, multiple foci, largest focus 0.8cm. DCIS. Margins negative. 0/2 LN.  [Disease: _____________________] : Disease: [unfilled] [T: ___] : T[unfilled] [N: ___] : N[unfilled] [M: ___] : M[unfilled] [AJCC Stage: ____] : AJCC Stage: [unfilled] [de-identified] : Patient is a 79yo F who presents today for postop visit. Patient recently underwent R total mastectomy with RSLNB for diagnosis of R multicentric IDC (ER/MT +, HER2 -). 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 On extent of disease MRI, recommendation for 3 site mri biopsy was made yielding R multicentric IDC (ER/MT +/ HER2-).  Oncotype 6. Denies family history of breast or ovarian cancer. Patient denies fever, chills, purulent discharge from surgical site, palpable masses, skin changes, or left nipple discharge.  Staging: pT1bN0  12/22/17: B/l MG- JANE 1/7/19: B/l MG- no evidence of disease 1/8/20: B/l MG- benign microcal bilat 3/4/21: B/L MG- Dense. LUOQ postsurgical changes. B/l scattered calcs. B/l scattered ovoid circumscribed subcentimeter masses, unchanged and benign. BIRAD2. 3/24/22: B/l MG & US- heterogenously dense. JANE. BI-RADS 2 3/27/23: B/l MG & US- heterogenously dense. JANE. BI-RADS 1 4/1/24: B/L MG & US-heterogeneously dense, central right 5FN asymmetry with subtle distortion, stereo rec. BIRADS 4 4/4/2024-right stereotactic biopsy architectural distortion-ADH bordering on DCIS (cork clip). 4/23/24 MRI: Heterogeneously dense. R 1.9cm nonmass enhancement 6:00 proven ADH bordering DCIS. R 12:00 UIQ and R 6:00 LIQ multiple indeterminate enhancing masses and nonmass enhancement (rec R MRI bx x 3 - 6:00  0.9cm enhancing mass, 6:00 0.9cm posterior depth linear nonmass enhancement, 2:00  0.5 cm). R 2 prominent axillary LN (rec targeted US w/ poss bx). BI-RADS 4 5/9/24 R US: R axillary LNs demonstrate focal cortical thickening, with the largest node measuring 1.3cm (f/u US bx). BI-RADS 4. 5/9/2024-right MR biopsy 12:00-IDC hourglass clip                Right MRI biopsy 6:00-IDC buckle shaped clip                Right MR biopsy right lower outer quadrant-IDC Infinity clip receptors pending. (ER/MT +, HER2 -) 5/17/2024: R ax US bx: lymph node, negative for carcinoma, benign and concordant.  7/23/2024: Right Mastectomy, RSLNB: IDC, well differentiated, multiple foci, largest focus 0.8cm. DCIS. Margins negative. 0/2 LN.  [de-identified] : ER/AL+, HER2-, Oncotype RS 6

## 2024-08-15 NOTE — HISTORY OF PRESENT ILLNESS
[FreeTextEntry1] : Patient is a 79yo F who presents today for postop visit. Patient recently underwent R total mastectomy with RSLNB for diagnosis of R multicentric IDC (ER/MI +, HER2 -). 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 On extent of disease MRI, recommendation for 3 site mri biopsy was made yielding R multicentric IDC (ER/MI +/ HER2-).  Oncotype 6. Denies family history of breast or ovarian cancer. Patient denies fever, chills, purulent discharge from surgical site, palpable masses, skin changes, or left nipple discharge.  Staging: pT1bN0  12/22/17: B/l MG- JANE 1/7/19: B/l MG- no evidence of disease 1/8/20: B/l MG- benign microcal bilat 3/4/21: B/L MG- Dense. LUOQ postsurgical changes. B/l scattered calcs. B/l scattered ovoid circumscribed subcentimeter masses, unchanged and benign. BIRAD2. 3/24/22: B/l MG & US- heterogenously dense. JANE. BI-RADS 2 3/27/23: B/l MG & US- heterogenously dense. JANE. BI-RADS 1 4/1/24: B/L MG & US-heterogeneously dense, central right 5FN asymmetry with subtle distortion, stereo rec. BIRADS 4 4/4/2024-right stereotactic biopsy architectural distortion-ADH bordering on DCIS (cork clip). 4/23/24 MRI: Heterogeneously dense. R 1.9cm nonmass enhancement 6:00 proven ADH bordering DCIS. R 12:00 UIQ and R 6:00 LIQ multiple indeterminate enhancing masses and nonmass enhancement (rec R MRI bx x 3 - 6:00  0.9cm enhancing mass, 6:00 0.9cm posterior depth linear nonmass enhancement, 2:00  0.5 cm). R 2 prominent axillary LN (rec targeted US w/ poss bx). BI-RADS 4 5/9/24 R US: R axillary LNs demonstrate focal cortical thickening, with the largest node measuring 1.3cm (f/u US bx). BI-RADS 4. 5/9/2024-right MR biopsy 12:00-IDC hourglass clip                Right MRI biopsy 6:00-IDC buckle shaped clip                Right MR biopsy right lower outer quadrant-IDC Infinity clip receptors pending. (ER/MI +, HER2 -) 5/17/2024: R ax US bx: lymph node, negative for carcinoma, benign and concordant.  7/23/2024: Right Mastectomy, RSLNB: IDC, well differentiated, multiple foci, largest focus 0.8cm. DCIS. Margins negative. 0/2 LN.

## 2024-08-15 NOTE — HISTORY OF PRESENT ILLNESS
[FreeTextEntry1] : Patient is a 77yo F who presents today for postop visit. Patient recently underwent R total mastectomy with RSLNB for diagnosis of R multicentric IDC (ER/MO +, HER2 -). 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 On extent of disease MRI, recommendation for 3 site mri biopsy was made yielding R multicentric IDC (ER/MO +/ HER2-).  Oncotype 6. Denies family history of breast or ovarian cancer. Patient denies fever, chills, purulent discharge from surgical site, palpable masses, skin changes, or left nipple discharge.  Staging: pT1bN0  12/22/17: B/l MG- JANE 1/7/19: B/l MG- no evidence of disease 1/8/20: B/l MG- benign microcal bilat 3/4/21: B/L MG- Dense. LUOQ postsurgical changes. B/l scattered calcs. B/l scattered ovoid circumscribed subcentimeter masses, unchanged and benign. BIRAD2. 3/24/22: B/l MG & US- heterogenously dense. JANE. BI-RADS 2 3/27/23: B/l MG & US- heterogenously dense. JANE. BI-RADS 1 4/1/24: B/L MG & US-heterogeneously dense, central right 5FN asymmetry with subtle distortion, stereo rec. BIRADS 4 4/4/2024-right stereotactic biopsy architectural distortion-ADH bordering on DCIS (cork clip). 4/23/24 MRI: Heterogeneously dense. R 1.9cm nonmass enhancement 6:00 proven ADH bordering DCIS. R 12:00 UIQ and R 6:00 LIQ multiple indeterminate enhancing masses and nonmass enhancement (rec R MRI bx x 3 - 6:00  0.9cm enhancing mass, 6:00 0.9cm posterior depth linear nonmass enhancement, 2:00  0.5 cm). R 2 prominent axillary LN (rec targeted US w/ poss bx). BI-RADS 4 5/9/24 R US: R axillary LNs demonstrate focal cortical thickening, with the largest node measuring 1.3cm (f/u US bx). BI-RADS 4. 5/9/2024-right MR biopsy 12:00-IDC hourglass clip                Right MRI biopsy 6:00-IDC buckle shaped clip                Right MR biopsy right lower outer quadrant-IDC Infinity clip receptors pending. (ER/MO +, HER2 -) 5/17/2024: R ax US bx: lymph node, negative for carcinoma, benign and concordant.  7/23/2024: Right Mastectomy, RSLNB: IDC, well differentiated, multiple foci, largest focus 0.8cm. DCIS. Margins negative. 0/2 LN.

## 2024-08-15 NOTE — HISTORY OF PRESENT ILLNESS
[FreeTextEntry1] : Patient is a 79yo F who presents today for postop visit. Patient recently underwent R total mastectomy with RSLNB for diagnosis of R multicentric IDC (ER/CT +, HER2 -). 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 On extent of disease MRI, recommendation for 3 site mri biopsy was made yielding R multicentric IDC (ER/CT +/ HER2-).  Oncotype 6. Denies family history of breast or ovarian cancer. Patient denies fever, chills, purulent discharge from surgical site, palpable masses, skin changes, or left nipple discharge.  Staging: pT1bN0  12/22/17: B/l MG- JANE 1/7/19: B/l MG- no evidence of disease 1/8/20: B/l MG- benign microcal bilat 3/4/21: B/L MG- Dense. LUOQ postsurgical changes. B/l scattered calcs. B/l scattered ovoid circumscribed subcentimeter masses, unchanged and benign. BIRAD2. 3/24/22: B/l MG & US- heterogenously dense. JANE. BI-RADS 2 3/27/23: B/l MG & US- heterogenously dense. JANE. BI-RADS 1 4/1/24: B/L MG & US-heterogeneously dense, central right 5FN asymmetry with subtle distortion, stereo rec. BIRADS 4 4/4/2024-right stereotactic biopsy architectural distortion-ADH bordering on DCIS (cork clip). 4/23/24 MRI: Heterogeneously dense. R 1.9cm nonmass enhancement 6:00 proven ADH bordering DCIS. R 12:00 UIQ and R 6:00 LIQ multiple indeterminate enhancing masses and nonmass enhancement (rec R MRI bx x 3 - 6:00  0.9cm enhancing mass, 6:00 0.9cm posterior depth linear nonmass enhancement, 2:00  0.5 cm). R 2 prominent axillary LN (rec targeted US w/ poss bx). BI-RADS 4 5/9/24 R US: R axillary LNs demonstrate focal cortical thickening, with the largest node measuring 1.3cm (f/u US bx). BI-RADS 4. 5/9/2024-right MR biopsy 12:00-IDC hourglass clip                Right MRI biopsy 6:00-IDC buckle shaped clip                Right MR biopsy right lower outer quadrant-IDC Infinity clip receptors pending. (ER/CT +, HER2 -) 5/17/2024: R ax US bx: lymph node, negative for carcinoma, benign and concordant.  7/23/2024: Right Mastectomy, RSLNB: IDC, well differentiated, multiple foci, largest focus 0.8cm. DCIS. Margins negative. 0/2 LN.  [Disease: _____________________] : Disease: [unfilled] [T: ___] : T[unfilled] [N: ___] : N[unfilled] [M: ___] : M[unfilled] [AJCC Stage: ____] : AJCC Stage: [unfilled] [de-identified] : Patient is a 79yo F who presents today for postop visit. Patient recently underwent R total mastectomy with RSLNB for diagnosis of R multicentric IDC (ER/MA +, HER2 -). 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 On extent of disease MRI, recommendation for 3 site mri biopsy was made yielding R multicentric IDC (ER/MA +/ HER2-).  Oncotype 6. Denies family history of breast or ovarian cancer. Patient denies fever, chills, purulent discharge from surgical site, palpable masses, skin changes, or left nipple discharge.  Staging: pT1bN0  12/22/17: B/l MG- JANE 1/7/19: B/l MG- no evidence of disease 1/8/20: B/l MG- benign microcal bilat 3/4/21: B/L MG- Dense. LUOQ postsurgical changes. B/l scattered calcs. B/l scattered ovoid circumscribed subcentimeter masses, unchanged and benign. BIRAD2. 3/24/22: B/l MG & US- heterogenously dense. JANE. BI-RADS 2 3/27/23: B/l MG & US- heterogenously dense. JANE. BI-RADS 1 4/1/24: B/L MG & US-heterogeneously dense, central right 5FN asymmetry with subtle distortion, stereo rec. BIRADS 4 4/4/2024-right stereotactic biopsy architectural distortion-ADH bordering on DCIS (cork clip). 4/23/24 MRI: Heterogeneously dense. R 1.9cm nonmass enhancement 6:00 proven ADH bordering DCIS. R 12:00 UIQ and R 6:00 LIQ multiple indeterminate enhancing masses and nonmass enhancement (rec R MRI bx x 3 - 6:00  0.9cm enhancing mass, 6:00 0.9cm posterior depth linear nonmass enhancement, 2:00  0.5 cm). R 2 prominent axillary LN (rec targeted US w/ poss bx). BI-RADS 4 5/9/24 R US: R axillary LNs demonstrate focal cortical thickening, with the largest node measuring 1.3cm (f/u US bx). BI-RADS 4. 5/9/2024-right MR biopsy 12:00-IDC hourglass clip                Right MRI biopsy 6:00-IDC buckle shaped clip                Right MR biopsy right lower outer quadrant-IDC Infinity clip receptors pending. (ER/MA +, HER2 -) 5/17/2024: R ax US bx: lymph node, negative for carcinoma, benign and concordant.  7/23/2024: Right Mastectomy, RSLNB: IDC, well differentiated, multiple foci, largest focus 0.8cm. DCIS. Margins negative. 0/2 LN.  [de-identified] : ER/RI+, HER2-, Oncotype RS 6

## 2025-04-02 ENCOUNTER — APPOINTMENT (OUTPATIENT)
Dept: BREAST CENTER | Facility: CLINIC | Age: 80
End: 2025-04-02

## 2025-04-16 NOTE — ASU DISCHARGE PLAN (ADULT/PEDIATRIC) - ***IN THE EVENT THAT YOU DEVELOP A COMPLICATION AND YOU ARE UNABLE TO REACH YOUR OWN PHYSICIAN, YOU MAY CONTACT:
Patient family attempting to get patient out of bed without nursing assistance multiple times. Patients family turned off bed alarm. RN educated family and patient on importance of waiting for staff to assist with ambulation as well as use of bed/chair alarm, call light, and keeping patients curtain open. RN ensured bed/chair alarm are on, call light is within reach, patient is wearing yellow socks, and curtain is open. All patients personal items are within reach. Patient has fall risk wrist band on.    Statement Selected

## 2025-06-16 NOTE — OPERATIVE REPORT - OPERATIVE RPOSRT DETAILS
Anesthesia Pre Eval Note    Anesthesia ROS/Med Hx        Anesthetic Complication History:    Patient does not have a history of anesthetic complications      Pulmonary Review:    Positive for sleep apnea - CPAP  Negative for COPD   Negative for asthma  The patient is not a smoker.    Neuro/Psych Review:  Patient does not have a neuro/psych history         Cardiovascular Review:   Exercise tolerance: good (>4 METS)  Negative for past MI  Negative for ICD   Negative for pacemaker   Negative CABG  Positive for hypertension  Positive for hyperlipidemia    GI/HEPATIC/RENAL Review:  Patient does not have a GI/hepatic/renalhistory     Negative for GERD    End/Other Review:  Negative for diabetes  Positive for obesity class I - 30.00 - 34.99  Positive for hypothyroidism  Additional Results:      ALLERGIES:   -- Fentanyl -- RASH   -- Versed -- RASH   Last Labs        Component                Value               Date/Time                  WBC                      5.4                 03/11/2025 0833            RBC                      4.28                03/11/2025 0833            HGB                      13.2                03/11/2025 0833            HCT                      39.1                03/11/2025 0833            MCV                      91.4                03/11/2025 0833            MCH                      30.8                03/11/2025 0833            MCHC                     33.8                03/11/2025 0833            RDW-CV                   12.3                03/11/2025 0833            Sodium                   141                 03/11/2025 0833            Potassium                5.1                 03/11/2025 0833            Chloride                 107                 03/11/2025 0833            Carbon Dioxide           32                  03/11/2025 0833            Glucose                  105 (H)             03/11/2025 0833            BUN                      15                  03/11/2025 0833            DATE OF SURGERY:11-1-23    Surgeon:  Marsha Patterson    PRE-OP DIAGNOSIS: Cataract Right Eye; Astigmatism Right Eye    POST-OP DIAGNOSIS: Same    ANESTHESIA: MAC    PROCEDURE: Cataract extraction with intraocular lens implant Right eye, Toric IOL    SPECIMEN/TISSUE REMOVED: None    ESTIMATED BLOOD LOSS: < 1mL    COMPLICATIONS: None    The patient was brought to the operating room.  A drop of topical anesthetic was placed in the eye. The 90 and 180 degree meridians were marked with the patient erect. The patient was prepped and draped in the usual sterile fashion, including Betadine drops in the eye. A lid speculum was placed between the lids of the right eye. The 180 axis was marked. A paracentesis was made superiorly. Intracameral preservative free lidocaine was instilled and the anterior chamber was filled with air, trypan blue, and viscoelastic. A clear cornea temporal incision was created using a 2.4mm keratome. A continuous curvilinear capsulorhexis was started with a cystotome and completed with capsulorhexis forceps. The lens was hydrodissected with BSS. The lens was then phacoemulsified using a divide and conquer technique. Residual cortical material was removed using automated irrigation and aspiration. The capsular bag was reformed using a viscoelastic. A [id number] 20.50 lens was inserted into the bag and rotated into the  180 axis. Symmetric capsular bag fixation was confirmed. The remaining viscoelastic was removed with automated irrigation and aspiration. The wounds were hydrated and checked to be water tight. The lid speculum was removed. Antibiotic/steroid ointment was instilled in the eye and a shield was placed over the eye. The patient left the OR in stable condition having tolerated the procedure well. IMarsha was present throughout the case.  Creatinine               0.77                03/11/2025 0833            Glomerular Filtrati*     86                  03/11/2025 0833            Calcium                  9.5                 03/11/2025 0833            PLT                      240                 03/11/2025 0833               Physical Exam     Airway   Mallampati: I  TM Distance: >3 FB  Neck ROM: Full  Neck: Able to place in sniff position  TMJ Mobility: Good    Cardiovascular  Cardiovascular exam normal  Cardio Rhythm: Regular  Cardio Rate: Normal    Head Assessment  Head assessment: Normocephalic and Atraumatic    General Assessment  General Assessment: Alert and oriented and No acute distress    Dental Exam  Dental exam normal    Pulmonary Exam  Pulmonary exam normal  Breath sounds clear to auscultation:  Yes    Abdominal Exam    Patient Demonstrates:  Obese      Vitals:   Patient Vitals for the past 4 hrs (Last 1 readings):   Temp Temp src Height Weight   06/16/25 0810 36.6 °C (97.9 °F) Oral 5' 5\" (1.651 m) 89.9 kg (198 lb 3.1 oz)         Anesthesia Plan:    ASA Status: 2  Anesthesia Type: MAC    Induction: Intravenous  Checklist  Reviewed: Lab Results, EKG, NPO Status, Allergies, Medications, Problem list and Past Med History  Consent/Risks Discussed Statement:  The proposed anesthetic plan, including its risks and benefits, have been discussed with the Patient and Spouse along with the risks and benefits of alternatives. Questions were encouraged and answered and the patient and/or representative understands and agrees to proceed.        I discussed with the patient (and/or patient's legal representative) the risks and benefits of the proposed anesthesia plan, MAC, which may include services performed by other anesthesia providers.    Alternative anesthesia plans, if available, were reviewed with the patient (and/or patient's legal representative). Discussion has been held with the patient (and/or patient's legal representative) regarding risks  of anesthesia, which include  emergent situations that may require change in anesthesia plan.    The patient (and/or patient's legal representative) has indicated understanding, his/her questions have been answered, and he/she wishes to proceed with the planned anesthetic.    Blood Products: Not Anticipated    Comments  Plan Comments: ..The plan for anesthesia, including monitored anesthesia care (MAC) with sedation, has been discussed with the patient. Sedation depth will be carefully adjusted, and the level of sedation is limited by the patient's respiratory status. Continuous monitoring of vital signs, including respiratory rate, oxygen saturation, and airway status, will be maintained throughout the procedure. The patient has been informed that, should sedation prove inadequate or airway concerns arise, conversion to general anesthesia may be necessary to ensure safety.

## 2025-06-21 ENCOUNTER — NON-APPOINTMENT (OUTPATIENT)
Age: 80
End: 2025-06-21

## 2025-06-27 ENCOUNTER — NON-APPOINTMENT (OUTPATIENT)
Age: 80
End: 2025-06-27

## 2025-06-27 ENCOUNTER — APPOINTMENT (OUTPATIENT)
Dept: BREAST CENTER | Facility: CLINIC | Age: 80
End: 2025-06-27
Payer: MEDICARE

## 2025-06-27 VITALS
DIASTOLIC BLOOD PRESSURE: 77 MMHG | HEART RATE: 73 BPM | SYSTOLIC BLOOD PRESSURE: 131 MMHG | HEIGHT: 61 IN | BODY MASS INDEX: 34.78 KG/M2 | WEIGHT: 184.2 LBS

## 2025-06-27 PROCEDURE — 99213 OFFICE O/P EST LOW 20 MIN: CPT

## 2025-06-27 RX ORDER — ANASTROZOLE TABLETS 1 MG/1
1 TABLET ORAL
Refills: 0 | Status: ACTIVE | COMMUNITY

## 2025-06-27 RX ORDER — ROSUVASTATIN CALCIUM 5 MG/1
TABLET, FILM COATED ORAL
Refills: 0 | Status: ACTIVE | COMMUNITY

## (undated) DEVICE — Device

## (undated) DEVICE — GLV 6.5 PROTEXIS W HYDROGEL

## (undated) DEVICE — DRAPE IOBAN 23" X 23"

## (undated) DEVICE — PACK CENTURION 2.4MM

## (undated) DEVICE — SOL IRR BAL SALT 500ML

## (undated) DEVICE — TIP OZIL 12 DEGREE MINI FLARE

## (undated) DEVICE — DRAPE PROBE COVER 5" X 96"

## (undated) DEVICE — SUT SILK 2-0 30" PSL

## (undated) DEVICE — SUT NYLON 10-0 12" CU-5

## (undated) DEVICE — PACK ANTERIOR SEGMENT

## (undated) DEVICE — DRAIN JACKSON PRATT 10MM FLAT 3/4 NO TROCAR

## (undated) DEVICE — DRSG STOCKINETTE TUBULAR COTTON 2PLY 6X60"

## (undated) DEVICE — STAPLER SKIN PROXIMATE

## (undated) DEVICE — DRAPE MICROSCOPE KNOB COVER SMALL (2 PCS)

## (undated) DEVICE — SYR LUER LOK 5CC

## (undated) DEVICE — SUT SILK 2-0 18" PS

## (undated) DEVICE — KNIFE ALCON PARACENTESIS CLEARCUT SIDEPORT 1MM (YELLOW)

## (undated) DEVICE — PACK BREAST RECONSTRUCTION

## (undated) DEVICE — TRANSFORMER INTREPID I/A 0.3MM

## (undated) DEVICE — DRSG STERISTRIPS 0.5 X 4"

## (undated) DEVICE — ELCTR BOVIE TIP BLADE INSULATED 2.75" EDGE

## (undated) DEVICE — GOWN SLEEVES

## (undated) DEVICE — SOL SYR OPHTHALMIC VISION BLUE TRYPAN BLUE 0.06% 0.5 ML

## (undated) DEVICE — CANNULA IRR ANT CHAMBER 30G

## (undated) DEVICE — DRSG GAUZE MOISTURIZER 0.5 OZ 4X8

## (undated) DEVICE — SUT VICRYL 2-0 27" SH

## (undated) DEVICE — DRAIN RESERVOIR FOR JACKSON PRATT 100CC CARDINAL

## (undated) DEVICE — PACK PROC UNIV MAYO STAND 29118

## (undated) DEVICE — SYR LUER LOK 10CC

## (undated) DEVICE — DRSG 3M TEGADERM CHG CHLORHEXIDINE GLUCONATE GEL PAD

## (undated) DEVICE — MARKING PEN W RULER

## (undated) DEVICE — DRAPE CAMERA VIDEO 7"X96"

## (undated) DEVICE — ELCTR STRYKER NEPTUNE SMOKE EVACUATION PENCIL (GREEN)

## (undated) DEVICE — VENODYNE/SCD SLEEVE CALF MEDIUM

## (undated) DEVICE — DRAPE TOWEL BLUE 17" X 24"

## (undated) DEVICE — DRSG COMBINE 5X9"

## (undated) DEVICE — ONETRAC LIGHTED RETRACTOR 135 X 30MM DISP

## (undated) DEVICE — DRSG GAUZE FLUFF 1PLY 18X30"